# Patient Record
Sex: FEMALE | Race: WHITE
[De-identification: names, ages, dates, MRNs, and addresses within clinical notes are randomized per-mention and may not be internally consistent; named-entity substitution may affect disease eponyms.]

---

## 2020-03-18 ENCOUNTER — HOSPITAL ENCOUNTER (OUTPATIENT)
Dept: HOSPITAL 33 - SDC-PAIN | Age: 60
Discharge: HOME | End: 2020-03-18
Attending: PSYCHIATRY & NEUROLOGY
Payer: COMMERCIAL

## 2020-03-18 DIAGNOSIS — F41.8: ICD-10-CM

## 2020-03-18 DIAGNOSIS — Z79.899: ICD-10-CM

## 2020-03-18 DIAGNOSIS — K21.9: ICD-10-CM

## 2020-03-18 DIAGNOSIS — M54.12: Primary | ICD-10-CM

## 2020-03-18 PROCEDURE — 72040 X-RAY EXAM NECK SPINE 2-3 VW: CPT

## 2020-03-18 PROCEDURE — 62321 NJX INTERLAMINAR CRV/THRC: CPT

## 2020-03-18 PROCEDURE — 77003 FLUOROGUIDE FOR SPINE INJECT: CPT

## 2020-03-18 NOTE — XRAY
Indication: Cervical JUDAH.



Intraoperative fluoroscopy was provided for 22 seconds.  2 digital spot images

submitted for interpretation demonstrates midline needle tip just posterior to

C7.  Small amount of contrast injected for needle tip placement.  Correlate

with intraoperative findings/report.

## 2020-07-01 ENCOUNTER — HOSPITAL ENCOUNTER (OUTPATIENT)
Dept: HOSPITAL 33 - SDC-PAIN | Age: 60
Discharge: HOME | End: 2020-07-01
Attending: PSYCHIATRY & NEUROLOGY
Payer: COMMERCIAL

## 2020-07-01 DIAGNOSIS — Z79.899: ICD-10-CM

## 2020-07-01 DIAGNOSIS — K21.9: ICD-10-CM

## 2020-07-01 DIAGNOSIS — M47.812: Primary | ICD-10-CM

## 2020-07-01 PROCEDURE — 64491 INJ PARAVERT F JNT C/T 2 LEV: CPT

## 2020-07-01 PROCEDURE — 77002 NEEDLE LOCALIZATION BY XRAY: CPT

## 2020-07-01 PROCEDURE — 64490 INJ PARAVERT F JNT C/T 1 LEV: CPT

## 2020-07-01 PROCEDURE — 72020 X-RAY EXAM OF SPINE 1 VIEW: CPT

## 2020-07-01 NOTE — XRAY
Indication: Right C2-C4 MBB.



Intraoperative fluoroscopy was provided for 20 seconds.  2 digital spot images

submitted for interpretation demonstrates posterior needle tips projecting

over the expected course of the right C2-C4 nerve roots.  Correlate with

intraoperative findings/report.

## 2020-10-07 ENCOUNTER — HOSPITAL ENCOUNTER (OUTPATIENT)
Dept: HOSPITAL 33 - SDC-PAIN | Age: 60
Discharge: HOME | End: 2020-10-07
Attending: PSYCHIATRY & NEUROLOGY
Payer: COMMERCIAL

## 2020-10-07 DIAGNOSIS — F41.8: ICD-10-CM

## 2020-10-07 DIAGNOSIS — Z79.899: ICD-10-CM

## 2020-10-07 DIAGNOSIS — M54.16: Primary | ICD-10-CM

## 2020-10-07 DIAGNOSIS — K21.9: ICD-10-CM

## 2020-10-07 PROCEDURE — 72100 X-RAY EXAM L-S SPINE 2/3 VWS: CPT

## 2020-10-07 PROCEDURE — 77003 FLUOROGUIDE FOR SPINE INJECT: CPT

## 2020-10-07 NOTE — XRAY
Indication: Left L4-L5 transforaminal JUDAH.



Intraoperative fluoroscopy provided for 1 minute 4 seconds.  3 digital spot

images submitted for interpretation demonstrates posterior needle tip

projecting over the expected left L5 nerve root.  Small amount of contrast

injected for needle tip placement.  Correlate with intraoperative

findings/report.  Incidental partially visualized bilateral posterior L4-L5

fusion hardware.

## 2020-10-21 ENCOUNTER — HOSPITAL ENCOUNTER (OUTPATIENT)
Dept: HOSPITAL 33 - SDC-PAIN | Age: 60
Discharge: HOME | End: 2020-10-21
Attending: PSYCHIATRY & NEUROLOGY
Payer: COMMERCIAL

## 2020-10-21 DIAGNOSIS — Z79.899: ICD-10-CM

## 2020-10-21 DIAGNOSIS — M16.12: ICD-10-CM

## 2020-10-21 DIAGNOSIS — K21.9: ICD-10-CM

## 2020-10-21 DIAGNOSIS — M60.9: Primary | ICD-10-CM

## 2020-10-21 PROCEDURE — 72020 X-RAY EXAM OF SPINE 1 VIEW: CPT

## 2020-10-21 PROCEDURE — 77002 NEEDLE LOCALIZATION BY XRAY: CPT

## 2020-10-21 PROCEDURE — 20610 DRAIN/INJ JOINT/BURSA W/O US: CPT

## 2020-10-21 PROCEDURE — 20605 DRAIN/INJ JOINT/BURSA W/O US: CPT

## 2020-10-21 PROCEDURE — 73501 X-RAY EXAM HIP UNI 1 VIEW: CPT

## 2020-10-21 PROCEDURE — 20552 NJX 1/MLT TRIGGER POINT 1/2: CPT

## 2020-10-21 NOTE — XRAY
Indication: Left piriformis muscle injection.



Intraoperative fluoroscopy was provided for 7 seconds.  Single digital spot

image obtained prone demonstrates posterior needle tip projecting over the

expected left piriformis muscle.  Small amount of contrast injected for needle

tip placement.  Correlate with intraoperative findings/report.

## 2020-10-21 NOTE — XRAY
Indication: Left greater trochanter bursa injection.



Intraoperative fluoroscopy was provided for 11 seconds.  Single digital spot

image obtained prone demonstrates posterior needle tip projecting just lateral

to the left greater trochanter.  Small amount of contrast injected for needle

tip placement.  Correlate with intraoperative findings/report.